# Patient Record
Sex: FEMALE | Race: WHITE | NOT HISPANIC OR LATINO
[De-identification: names, ages, dates, MRNs, and addresses within clinical notes are randomized per-mention and may not be internally consistent; named-entity substitution may affect disease eponyms.]

---

## 2017-12-07 ENCOUNTER — RESULT REVIEW (OUTPATIENT)
Age: 72
End: 2017-12-07

## 2019-08-04 ENCOUNTER — TRANSCRIPTION ENCOUNTER (OUTPATIENT)
Age: 74
End: 2019-08-04

## 2019-08-05 ENCOUNTER — EMERGENCY (EMERGENCY)
Facility: HOSPITAL | Age: 74
LOS: 1 days | Discharge: HOME | End: 2019-08-05
Attending: EMERGENCY MEDICINE
Payer: MEDICARE

## 2019-08-05 VITALS
DIASTOLIC BLOOD PRESSURE: 92 MMHG | TEMPERATURE: 98 F | OXYGEN SATURATION: 94 % | SYSTOLIC BLOOD PRESSURE: 165 MMHG | HEIGHT: 56 IN | RESPIRATION RATE: 18 BRPM | HEART RATE: 84 BPM | WEIGHT: 126.1 LBS

## 2019-08-05 PROCEDURE — 70450 CT HEAD/BRAIN W/O DYE: CPT | Mod: 26

## 2019-08-05 PROCEDURE — 70450 CT HEAD/BRAIN W/O DYE: CPT

## 2019-08-05 PROCEDURE — 90715 TDAP VACCINE 7 YRS/> IM: CPT

## 2019-08-05 PROCEDURE — 14040 TIS TRNFR F/C/C/M/N/A/G/H/F: CPT

## 2019-08-05 PROCEDURE — 99285 EMERGENCY DEPT VISIT HI MDM: CPT | Mod: 25

## 2019-08-05 PROCEDURE — 99284 EMERGENCY DEPT VISIT MOD MDM: CPT | Mod: GC

## 2019-08-05 PROCEDURE — 90471 IMMUNIZATION ADMIN: CPT

## 2019-08-05 RX ORDER — TETANUS TOXOID, REDUCED DIPHTHERIA TOXOID AND ACELLULAR PERTUSSIS VACCINE, ADSORBED 5; 2.5; 8; 8; 2.5 [IU]/.5ML; [IU]/.5ML; UG/.5ML; UG/.5ML; UG/.5ML
0.5 SUSPENSION INTRAMUSCULAR ONCE
Refills: 0 | Status: COMPLETED | OUTPATIENT
Start: 2019-08-05 | End: 2019-08-05

## 2019-08-05 RX ORDER — TETANUS TOXOID, REDUCED DIPHTHERIA TOXOID AND ACELLULAR PERTUSSIS VACCINE, ADSORBED 5; 2.5; 8; 8; 2.5 [IU]/.5ML; [IU]/.5ML; UG/.5ML; UG/.5ML; UG/.5ML
0.5 SUSPENSION INTRAMUSCULAR ONCE
Refills: 0 | Status: DISCONTINUED | OUTPATIENT
Start: 2019-08-05 | End: 2019-08-05

## 2019-08-05 RX ADMIN — TETANUS TOXOID, REDUCED DIPHTHERIA TOXOID AND ACELLULAR PERTUSSIS VACCINE, ADSORBED 0.5 MILLILITER(S): 5; 2.5; 8; 8; 2.5 SUSPENSION INTRAMUSCULAR at 17:39

## 2019-08-05 NOTE — ED PROVIDER NOTE - CLINICAL SUMMARY MEDICAL DECISION MAKING FREE TEXT BOX
74 YO F with forehead laceration requesting plastics after falling and hitting forehead against pavement. VSS. 2.5x1.5cm triangluar shaped skin laceration superior to R eyebrow, does not look contaminated. No confussion or any neurological deficits. Will provide tetanus prophylaxis. Head Ct given head trauma in elderly patient. Dr. Wheeler (plastics) consulted for laceration repair. 74 YO F with forehead laceration requesting plastics after falling and hitting forehead against pavement. VSS. 2.5x1.5cm triangluar shaped skin laceration superior to R eyebrow, does not look contaminated. No confussion or any neurological deficits. Will provide tetanus prophylaxis. Head Ct given head trauma in elderly patient. Dr. Wheeler (plastics) consulted for laceration repair. laceration repaired by Dr. Wheeler. 74 YO F with forehead laceration requesting plastics after falling and hitting forehead against pavement. VSS. 2.5x1.5cm triangluar shaped skin laceration superior to R eyebrow, does not look contaminated. No confussion or any neurological deficits. Will provide tetanus prophylaxis. Head Ct given head trauma in elderly patient. Dr. Wheeler (plastics) consulted for laceration repair. laceration repaired by Dr. Wheeler. Ct unremarkable

## 2019-08-05 NOTE — CONSULT NOTE ADULT - SUBJECTIVE AND OBJECTIVE BOX
Personal request  see dictated note  Consented.  tolerated repair of right forelead wound  f/u next wk

## 2019-08-05 NOTE — ED ADULT NURSE NOTE - OBJECTIVE STATEMENT
72 yo female A&OX3 presents to the ED with the c/o fall off uneven pavement. Pt states that she trip and fell hitting her head. Pt denies LOC, no AC use, no h/a or dizziness. Pt arrived via EMS with her dressing to her head, no active bleeding. Pt has lac above r eye. Pt denies any medical history. Pt denies visual changes, neuro intact. No numbness or tingling to upper or lower extremities. MAEX4. Pt and family requesting plastic for facial suture.

## 2019-08-05 NOTE — ED PROVIDER NOTE - NSFOLLOWUPINSTRUCTIONS_ED_ALL_ED_FT
You were seen in the ED for a forehead skin laceration after falling and hitting your head.   The following labs were obtained: CT head.   Take tylenol for pain.   Return to the ED if you develop confusion, worsening headache, blurry vision, chest pain, shortness of breath, fever, chills, lightheadedness.   Follow up with your primary care in 2-3 days. You were seen in the ED for a forehead skin laceration after falling and hitting your head.   The following labs were obtained: CT head: no hemorrhage   Take tylenol for pain.   Return to the ED if you develop confusion, worsening headache, blurry vision, chest pain, shortness of breath, fever, chills, lightheadedness.   Follow up with your primary care in 2-3 days.

## 2019-08-05 NOTE — ED PROVIDER NOTE - ATTENDING CONTRIBUTION TO CARE
Dr. Pedro : I have personally seen and examined this patient at the bedside. I have fully participated in the care of this patient. I have reviewed all pertinent clinical information, including history, physical exam, plan and the Resident's note and agree except as noted.     73y Female no pmhx pw forehead lac sp mechanical trip and fall. no loc.  presents after having a , no confusion, visual changes,     Denies f/c/n/v/cp/sob/palpitations/cough/abd.pain/d/c/dysuria/hematuria. no sick contacts/recent travel.    PE:  head; left sided forehead lac 2cm deep normocephalic  eyes: perrla eomi  Heart: rrr s1s2  lungs: ctab  abd: soft, nt nd + bs no rebound/guarding no cva ttp  le: no swelling no calf ttp  back: no midline cervical/thoracic/lumbar ttp      -->forehead lac + head trauma will fu ct head; plastics to repair lac tdap--dc

## 2019-08-05 NOTE — ED PROVIDER NOTE - OBJECTIVE STATEMENT
73y Female, not on anticoagulation, presents after having a forehead skin laceration secondary to falling 1 hour ago. Patient slipped and fell to the pavement hitting her left forehead against the pavement.  was next to her; no LOC, no confusion, visual changes, N, V or neck tenderness. No SOB, chest pain, abdominal pain, or joint pain, reports minor skin abrasions at elbows/ knees.

## 2019-08-05 NOTE — ED PROVIDER NOTE - NS ED ROS FT
GENERAL: No fever or chills  EYES: no change in vision  HEENT: no trouble swallowing or speaking  CARDIAC: no chest pain or palpiations   PULMONARY: no cough or SOB  GI: no abdominal pain, nausea, vomiting, diarrhea, or constipation   : No changes in urination  SKIN: See HPI  NEURO: no headache, numbness, or weakness  MSK: No joint pain

## 2019-08-05 NOTE — ED PROVIDER NOTE - PHYSICAL EXAMINATION
Gen: AAOx3, non-toxic  Head: NCAT  HEENT: EOMI, oral mucosa moist, normal conjunctiva  Lung: CTAB, no respiratory distress, no wheezes/rhonchi/rales B/L, speaking in full sentences  CV: RRR, no murmurs, rubs or gallops  Abd: soft, NTND, no guarding, no CVA tenderness  MSK: no visible deformities  Neuro: No focal sensory or motor deficits, normal CN exam, no cervical spine tenderness  Skin: 2.5x1.5cm triangular shaped skin laceration on the forehead, superior to right eyebrow. No erythema, or discharge, does not look contaminated  Psych: normal affect.